# Patient Record
Sex: MALE | Employment: FULL TIME | URBAN - METROPOLITAN AREA
[De-identification: names, ages, dates, MRNs, and addresses within clinical notes are randomized per-mention and may not be internally consistent; named-entity substitution may affect disease eponyms.]

---

## 2017-03-01 ENCOUNTER — HOSPITAL ENCOUNTER (OUTPATIENT)
Dept: LAB | Age: 32
Discharge: HOME | End: 2017-03-01

## 2017-03-01 LAB — SENTARA SPECIMEN COL,SENBCF: NORMAL

## 2017-03-01 PROCEDURE — 99001 SPECIMEN HANDLING PT-LAB: CPT

## 2017-03-02 ENCOUNTER — DOCUMENTATION ONLY (OUTPATIENT)
Dept: NEUROLOGY | Age: 32
End: 2017-03-02

## 2017-03-03 NOTE — TELEPHONE ENCOUNTER
Requested Prescriptions     Pending Prescriptions Disp Refills    levETIRAcetam (KEPPRA) 500 mg tablet 60 Tab 3     Sig: Take 1 Tab by mouth two (2) times a day.      Pt would like script to go to [facility] on [address]   Has upcoming appointment 03/22/2017  And only has 4 days worth of medication left

## 2017-03-05 LAB — LEVETIRACETAM, S, 716937: 5.9 UG/ML

## 2017-03-06 RX ORDER — LEVETIRACETAM 500 MG/1
500 TABLET ORAL 2 TIMES DAILY
Qty: 60 TAB | Refills: 1 | Status: SHIPPED | OUTPATIENT
Start: 2017-03-06 | End: 2017-03-22 | Stop reason: SDUPTHER

## 2017-03-06 NOTE — TELEPHONE ENCOUNTER
Requested Prescriptions     Pending Prescriptions Disp Refills    levETIRAcetam (KEPPRA) 500 mg tablet 60 Tab 3     Sig: Take 1 Tab by mouth two (2) times a day.      Patient states he only has one day left of medication

## 2017-03-22 ENCOUNTER — OFFICE VISIT (OUTPATIENT)
Dept: NEUROLOGY | Age: 32
End: 2017-03-22

## 2017-03-22 VITALS
HEIGHT: 73 IN | BODY MASS INDEX: 38.57 KG/M2 | TEMPERATURE: 98.5 F | SYSTOLIC BLOOD PRESSURE: 118 MMHG | DIASTOLIC BLOOD PRESSURE: 84 MMHG | WEIGHT: 291 LBS | OXYGEN SATURATION: 98 % | HEART RATE: 77 BPM | RESPIRATION RATE: 14 BRPM

## 2017-03-22 DIAGNOSIS — R56.9 CONVULSIONS, UNSPECIFIED CONVULSION TYPE (HCC): Primary | ICD-10-CM

## 2017-03-22 DIAGNOSIS — G47.33 OSA (OBSTRUCTIVE SLEEP APNEA): ICD-10-CM

## 2017-03-22 DIAGNOSIS — R56.9 NOCTURNAL SEIZURES (HCC): ICD-10-CM

## 2017-03-22 RX ORDER — LEVETIRACETAM 500 MG/1
TABLET ORAL
Qty: 90 TAB | Refills: 5 | Status: SHIPPED | OUTPATIENT
Start: 2017-03-22 | End: 2017-08-29 | Stop reason: SDUPTHER

## 2017-03-22 NOTE — MR AVS SNAPSHOT
Visit Information Date & Time Provider Department Dept. Phone Encounter #  
 3/22/2017  9:45 AM Kenneth Story  [facility] [address] [address] [id number] Follow-up Instructions Return in about 6 months (around 2017). Follow-up and Disposition History Upcoming Health Maintenance Date Due DTaP/Tdap/Td series (1 - Tdap) 2008 INFLUENZA AGE 9 TO ADULT 2016 Allergies as of 3/22/2017  Review Complete On: 3/22/2017 By: Kenneth Story MD  
 No Known Allergies Current Immunizations  Never Reviewed No immunizations on file. Not reviewed this visit You Were Diagnosed With   
  
 Codes Comments Convulsions, [facility]    -  Primary ICD-10-CM: R56.9 ICD-9-CM: 780.39 Nocturnal seizures ([facility]     ICD-10-CM: R15.460 ICD-9-CM: 345.80 MARTIN (obstructive sleep apnea)     ICD-10-CM: G47.33 
ICD-9-CM: 327.23 Vitals BP Pulse Temp Resp Height(growth percentile) Weight(growth percentile) 118/84 (BP 1 Location: Left arm, BP Patient Position: Sitting) 77 98.5 °F (36.9 °C) (Oral) 14 6' 1\" (1.854 m) 291 lb (132 kg) SpO2 BMI Smoking Status 98% 38.39 kg/m2 Never Smoker Vitals History BMI and BSA Data Body Mass Index Body Surface Area  
 38.39 kg/m 2 2.61 m 2 Preferred Pharmacy Pharmacy Name Phone [address], [facility], [address] [place] [address] [phone] Your Updated Medication List  
  
   
This list is accurate as of: 3/22/17 10:24 AM.  Always use your most recent med list.  
  
  
  
  
 levETIRAcetam 500 mg tablet Commonly known as:  KEPPRA  
1 q am 2 qhs  
  
 ONE-A-DAY MEN'S PO Take  by mouth. ZyrTEC 10 mg Cap Generic drug:  Cetirizine Take  by mouth. Prescriptions Sent to Pharmacy Refills  
 levETIRAcetam (KEPPRA) 500 mg tablet 5  Si q am 2 qhs  
 Class: Normal  
 Pharmacy: [address] [facility] #: [phone] Follow-up Instructions Return in about 6 months (around 9/22/2017). To-Do List   
 03/22/2017 Lab:  LEVETIRACETAM (KEPPRA) Patient Instructions   
leela has been seizure free for over 6 months may drive [facility]! Rei Khoury introduces [employer] patient portal. Now you can access parts of your medical record, email your doctor's office, and request medication refills online. 1. In your internet browser, go to [email or web address] 2. Click on the First Time User? Click Here link in the Sign In box. You will see the New Member Sign Up page. 3. Enter your [employer] Access Code exactly as it appears below. You will not need to use this code after youve completed the sign-up process. If you do not sign up before the expiration date, you must request a new code. · [employer] Access Code: [email or web address] Expires: 5/30/2017  3:08 PM 
 
4. Enter the last four digits of your Social Security Number (xxxx) and Date of Birth (mm/dd/yyyy) as indicated and click Submit. You will be taken to the next sign-up page. 5. Create a [employer] ID. This will be your [employer] login ID and cannot be changed, so think of one that is secure and easy to remember. 6. Create a [employer] password. You can change your password at any time. 7. Enter your Password Reset Question and Answer. This can be used at a later time if you forget your password. 8. Enter your e-mail address. You will receive e-mail notification when new information is available in [address] 9. Click Sign Up. You can now view and download portions of your medical record. 10. Click the Download Summary menu link to download a portable copy of your medical information. If you have questions, please visit the Frequently Asked Questions section of the [employer] website.  Remember, [employer] is NOT to be used for urgent needs. For medical emergencies, dial 911. Now available from your iPhone and Android! Please provide this summary of care documentation to your next provider. Your primary care clinician is listed as Yrn Valencia. If you have any questions after today's visit, please call [phone].

## 2017-03-22 NOTE — PROGRESS NOTES
[facility]             [address] [place] [address] Dr Franco, [address]    3/22/2017    HPI:  Klaudia Bar is a 36 y.o., left handed,   male, who presents with single seizure occurring in sleep. Patient reports that in August while sleeping he was observed by his girlfriend to have tonic-clonic movements in his sleep for several minutes then postictal confusion. He is unaware of the event. He did note that he did have a headache following the episode. Patient was not evaluated in the emergency room underwent extensive evaluation including MRI of the brain which is personally reviewed is normal as well as EEG that showed a single temporal discharge sharp. He denies prior history of head trauma family history of seizures or personal history of seizures he does snore but he denies daytime sleepiness he reports toleration of the Keppra which was started in the emergency room he denies recreational drug use is used and he is a . He has had episodes of dickson vu but no lapses of awareness    Patient reports no further seizuresno difficulty taking Keppra he is losing weight intentionally with exercise improved diet  Ess=5/24 stop bang 6/8  Current Outpatient Prescriptions   Medication Sig Dispense Refill    levETIRAcetam (KEPPRA) 500 mg tablet 1 q am 2 qhs 90 Tab 5    MULTIVIT-MINERALS/FA/LYCOPENE (ONE-A-DAY MEN'S PO) Take  by mouth.  Cetirizine (ZYRTEC) 10 mg cap Take  by mouth.          Past Medical History:   Diagnosis Date    Epilepsy [facility])        Past Surgical History:   Procedure Laterality Date    HX UROLOGICAL      testical     Family History   Problem Relation Age of Onset    Diabetes Mother     Asthma Father      No Known Allergies    Review of Systems:   Review of Systems - History obtained from the patient  General ROS: negative  Psychological ROS: negative  ENT ROS: negative  Hematological and Lymphatic ROS: negative  Endocrine ROS: negative  Respiratory ROS: no cough, shortness of breath, or wheezing  Cardiovascular ROS: no chest pain or dyspnea on exertion  Gastrointestinal ROS: no abdominal pain, change in bowel habits, or black or bloody stools  Genito-Urinary ROS: no dysuria, trouble voiding, or hematuria  Musculoskeletal ROS: negative  Neurological ROS: positive for - seizure  Dermatological ROS: negative    PHYSICAL EXAMINATION:    Visit Vitals    /84 (BP 1 Location: Left arm, BP Patient Position: Sitting)    Pulse 77    Temp 98.5 °F (36.9 °C) (Oral)    Resp 14    Ht 6' 1\" (1.854 m)    Wt 132 kg (291 lb)    SpO2 98%    BMI 38.39 kg/m2     General:  Well defined, nourished, and groomed individual in no acute distress. Neck: Supple, nontender, thyroid within normal limits, no JVD, no bruits, no pain with resistance to active range of motion. Nc=47 mallapati 3  Heart: Regular rate and rhythm, no murmurs, rub, or gallop. Normal S1S2. Lungs:  Clear to auscultation bilaterally with equal chest expansion, no cough, no wheeze  Musculoskeletal:  Extremities revealed no edema and had full range of motion of joints. Psych:  Good mood and normal affect    NEUROLOGICAL EXAMINATION:     Mental Status:   Alert and oriented to person, place, and time with recent and remote memory intact. Attention span and concentration are normal. Speech is fluent with a full fund of knowledge. Cranial Nerves:    II, III, IV, VI:  Visual acuity grossly intact. Visual fields are normal.    Pupils are equal, round, and reactive to light and accommodation. Extra-ocular movements are full and fluid. no ptosis or nystagmus. V-XII: Hearing is grossly intact. Facial features are symmetric 5/5. Motor Examination: Normal tone, bulk, and strength, 5/5 muscle strength throughout. No cogwheel rigidity or clonus present.       Coordination:     No resting or intention tremor    Gait and Station:  Steady while walking on toes, heels, and with tandem walking. Normal arm swing. No Romberg or pronator drift. No muscle wasting or fasiculations noted. psg reviewed as mild rem only martin  eeg single discharge sharp  Temporal,mri nl    keppra level 5.9  Assessment and Plan:   Lynette Brock is a 36 y.o. left handed male whose history and physical are consistent with   Single nocturnal seizure. Lynette Brock who has risk factors including mildly abnormal eeg  Helena Zach was seen today for follow-up and neurologic problem. Diagnoses and all orders for this visit:    Convulsions, unspecified convulsion type ([facility]  -     LEVETIRACETAM (KEPPRA); Future    Nocturnal seizures (HCC)  -     LEVETIRACETAM (KEPPRA); Future    MARTIN (obstructive sleep apnea)    Other orders  -     levETIRAcetam (KEPPRA) 500 mg tablet; 1 q am 2 qhs      Follow-up Disposition:  Return in about 6 months (around 9/22/2017). Reviewed workup  Accomplished and implications, dmv regulations reviewed r sleep study  As showing mild rem only MARTIN advised weight lossI spent 30  minutes with the patient in face-to-face consultation, of which greater than 50% was spent in counseling and coordination of care as described above.

## 2017-03-22 NOTE — LETTER
3/22/2017 10:20 AM 
 
Mr. Alvarez Keane [address] [facility] [id number] To Whom It May Concern, Alvarez Keane is under my care and is approved to resume driving a motor vehicle. Sincerely, Siomara Carver MD

## 2017-05-02 ENCOUNTER — TELEPHONE (OUTPATIENT)
Dept: NEUROLOGY | Age: 32
End: 2017-05-02

## 2017-05-02 NOTE — TELEPHONE ENCOUNTER
Patient would like to know if he can take a multi-vitamin called Opti-Men along with his Keppra.  Please advise patient at [phone]

## 2017-05-09 ENCOUNTER — TELEPHONE (OUTPATIENT)
Dept: NEUROLOGY | Age: 32
End: 2017-05-09

## 2017-08-29 ENCOUNTER — OFFICE VISIT (OUTPATIENT)
Dept: NEUROLOGY | Age: 32
End: 2017-08-29

## 2017-08-29 ENCOUNTER — HOSPITAL ENCOUNTER (OUTPATIENT)
Dept: LAB | Age: 32
Discharge: HOME | End: 2017-08-29

## 2017-08-29 VITALS
HEART RATE: 70 BPM | DIASTOLIC BLOOD PRESSURE: 70 MMHG | SYSTOLIC BLOOD PRESSURE: 110 MMHG | TEMPERATURE: 98.3 F | BODY MASS INDEX: 41.75 KG/M2 | OXYGEN SATURATION: 97 % | HEIGHT: 73 IN | WEIGHT: 315 LBS | RESPIRATION RATE: 18 BRPM

## 2017-08-29 DIAGNOSIS — R56.9 NOCTURNAL SEIZURES (HCC): Primary | ICD-10-CM

## 2017-08-29 DIAGNOSIS — R56.9 CONVULSIONS, UNSPECIFIED CONVULSION TYPE (HCC): ICD-10-CM

## 2017-08-29 DIAGNOSIS — R56.9 NOCTURNAL SEIZURES (HCC): ICD-10-CM

## 2017-08-29 LAB — SENTARA SPECIMEN COL,SENBCF: NORMAL

## 2017-08-29 PROCEDURE — 99001 SPECIMEN HANDLING PT-LAB: CPT | Performed by: PSYCHIATRY & NEUROLOGY

## 2017-08-29 RX ORDER — LEVETIRACETAM 500 MG/1
TABLET ORAL
Qty: 90 TAB | Refills: 5 | Status: SHIPPED | OUTPATIENT
Start: 2017-08-29 | End: 2018-03-01 | Stop reason: SDUPTHER

## 2017-08-29 NOTE — MR AVS SNAPSHOT
Visit Information Date & Time Provider Department Dept. Phone Encounter #  
 8/29/2017 11:00 AM Fermin Helton, [address] [id number] Follow-up Instructions Return in about 6 months (around 2/28/2018). Follow-up and Disposition History Your Appointments 3/1/2018 11:00 AM  
Follow Up with Fermin Helton MD  
[facility]) Appt Note: 6mon f/u; Labs [address] [place] [id number] [phone]  
  
   
 [place] [id number] Upcoming Health Maintenance Date Due DTaP/Tdap/Td series (1 - Tdap) 9/2/2008 INFLUENZA AGE 9 TO ADULT 8/1/2017 Allergies as of 8/29/2017  Review Complete On: 8/29/2017 By: Kathleen Turner LPN No Known Allergies Current Immunizations  Never Reviewed No immunizations on file. Not reviewed this visit You Were Diagnosed With   
  
 Codes Comments Nocturnal seizures ([facility]    -  Primary ICD-10-CM: G40.909 ICD-9-CM: 345.80 Convulsions, unspecified convulsion type ([facility]     ICD-10-CM: R56.9 ICD-9-CM: 780.39 Vitals BP Pulse Temp Resp Height(growth percentile) Weight(growth percentile) 110/70 70 98.3 °F (36.8 °C) (Oral) 18 6' 1\" (1.854 m) 320 lb (145.2 kg) SpO2 BMI Smoking Status 97% 42.22 kg/m2 Never Smoker BMI and BSA Data Body Mass Index Body Surface Area  
 42.22 kg/m 2 2.73 m 2 Preferred Pharmacy Pharmacy Name Phone [address], [address] [facility] [phone] Your Updated Medication List  
  
   
This list is accurate as of: 8/29/17 11:38 AM.  Always use your most recent med list.  
  
  
  
  
 levETIRAcetam 500 mg tablet Commonly known as:  KEPPRA  
1 q am 2 qhs  
  
 ONE-A-DAY MEN'S PO Take  by mouth. ZyrTEC 10 mg Cap Generic drug:  Cetirizine Take  by mouth. Prescriptions Sent to Pharmacy Refills  
 levETIRAcetam (KEPPRA) 500 mg tablet 5 Si q am 2 qhs  
 Class: Normal  
 Pharmacy: [address], [address] #: [phone] Follow-up Instructions Return in about 6 months (around 2018). To-Do List   
 2017 Lab:  LEVETIRACETAM (KEPPRA) Patient Instructions Reviewed need for keppra level before rtc Introducing [facility]! [facility] introduces [employer] patient portal. Now you can access parts of your medical record, email your doctor's office, and request medication refills online. 1. In your internet browser, go to [email or web address] 2. Click on the First Time User? Click Here link in the Sign In box. You will see the New Member Sign Up page. 3. Enter your [employer] Access Code exactly as it appears below. You will not need to use this code after youve completed the sign-up process. If you do not sign up before the expiration date, you must request a new code. · [employer] Access Code: [email or web address] Expires: 2017 10:43 AM 
 
4. Enter the last four digits of your Social Security Number (xxxx) and Date of Birth (mm/dd/yyyy) as indicated and click Submit. You will be taken to the next sign-up page. 5. Create a [employer] ID. This will be your [employer] login ID and cannot be changed, so think of one that is secure and easy to remember. 6. Create a [employer] password. You can change your password at any time. 7. Enter your Password Reset Question and Answer. This can be used at a later time if you forget your password. 8. Enter your e-mail address. You will receive e-mail notification when new information is available in [address] 9. Click Sign Up. You can now view and download portions of your medical record. 10. Click the Download Summary menu link to download a portable copy of your medical information. If you have questions, please visit the Frequently Asked Questions section of the [employer] website. Remember, [employer] is NOT to be used for urgent needs. For medical emergencies, dial 911. Now available from your iPhone and Android! Please provide this summary of care documentation to your next provider. Your primary care clinician is listed as Alverta Alert. If you have any questions after today's visit, please call [phone].

## 2017-08-29 NOTE — PROGRESS NOTES
[facility]             [address] [facility], [address] [place] [address]    8/29/2017    HPI:  Jim Caldwell is a 36 y.o., left handed,   male, who presents with single seizure occurring in sleep. Patient reports that in August while sleeping he was observed by his girlfriend to have tonic-clonic movements in his sleep for several minutes then postictal confusion. He is unaware of the event. He did note that he did have a headache following the episode. Patient was not evaluated in the emergency room underwent extensive evaluation including MRI of the brain which is personally reviewed is normal as well as EEG that showed a single temporal discharge sharp. He denies prior history of head trauma family history of seizures or personal history of seizures he does snore but he denies daytime sleepiness he reports toleration of the Keppra which was started in the emergency room he denies recreational drug use is used and he is a . He has had episodes of dickson vu but no lapses of awareness    Patient reports no further seizures no difficulty taking Keppra he is losing weight intentionally with exercise improved diet He did forget to get lab  Ess=5/24 stop bang 6/8  Current Outpatient Prescriptions   Medication Sig Dispense Refill    levETIRAcetam (KEPPRA) 500 mg tablet 1 q am 2 qhs 90 Tab 5    MULTIVIT-MINERALS/FA/LYCOPENE (ONE-A-DAY MEN'S PO) Take  by mouth.  Cetirizine (ZYRTEC) 10 mg cap Take  by mouth.          Past Medical History:   Diagnosis Date    Epilepsy [facility])        Past Surgical History:   Procedure Laterality Date    HX UROLOGICAL      testical     Family History   Problem Relation Age of Onset    Diabetes Mother     Asthma Father      No Known Allergies    Review of Systems:   Review of Systems - History obtained from the patient  General ROS: negative  Psychological ROS: negative  ENT ROS: negative  Hematological and Lymphatic ROS: negative  Endocrine ROS: negative  Respiratory ROS: no cough, shortness of breath, or wheezing  Cardiovascular ROS: no chest pain or dyspnea on exertion  Gastrointestinal ROS: no abdominal pain, change in bowel habits, or black or bloody stools  Genito-Urinary ROS: no dysuria, trouble voiding, or hematuria  Musculoskeletal ROS: negative  Neurological ROS: positive for - seizure  Dermatological ROS: negative    PHYSICAL EXAMINATION:    Visit Vitals    /70    Pulse 70    Temp 98.3 °F (36.8 °C) (Oral)    Resp 18    Ht 6' 1\" (1.854 m)    Wt 145.2 kg (320 lb)    SpO2 97%    BMI 42.22 kg/m2     General:  Well defined, nourished, and groomed individual in no acute distress. Neck: Supple, nontender, thyroid within normal limits, no JVD, no bruits, no pain with resistance to active range of motion. Nc=47 mallapati 3  Heart: Regular rate and rhythm, no murmurs, rub, or gallop. Normal S1S2. Lungs:  Clear to auscultation bilaterally with equal chest expansion, no cough, no wheeze  Musculoskeletal:  Extremities revealed no edema and had full range of motion of joints. Psych:  Good mood and normal affect    NEUROLOGICAL EXAMINATION:     Mental Status:   Alert and oriented to person, place, and time with recent and remote memory intact. Attention span and concentration are normal. Speech is fluent with a full fund of knowledge. Cranial Nerves:    II, III, IV, VI:  Visual acuity grossly intact. Visual fields are normal.    Pupils are equal, round, and reactive to light and accommodation. Extra-ocular movements are full and fluid. no ptosis or nystagmus. V-XII: Hearing is grossly intact. Facial features are symmetric 5/5. Motor Examination: Normal tone, bulk, and strength, 5/5 muscle strength throughout. No cogwheel rigidity or clonus present. Coordination:     No resting or intention tremor    Gait and Station:  Steady while walking  No muscle wasting or fasiculations noted. psg reviewed as mild rem only adia  eeg single discharge sharp  Temporal,mri nl    Assessment and Plan:   Mj Blake is a 36 y.o. left handed male whose history and physical are consistent with   Single nocturnal seizure. Mj Blake who has risk factors including mildly abnormal eeg  Diagnoses and all orders for this visit:    1. Nocturnal seizures (HCC)  -     LEVETIRACETAM (KEPPRA); Future    2. Convulsions, unspecified convulsion type ([facility]  -     LEVETIRACETAM (KEPPRA); Future    Other orders  -     levETIRAcetam (KEPPRA) 500 mg tablet; 1 q am 2 qhs      Follow-up Disposition:  Return in about 6 months (around 2/28/2018). Reviewed workup  Accomplished and implications, dmv regulations reviewed r sleep study  As showing mild rem only ADIA advised weight lossI spent 30  minutes with the patient in face-to-face consultation, of which greater than 50% was spent in counseling and coordination of care as described above.

## 2017-09-01 LAB — LEVETIRACETAM, S, 716937: 8.5 UG/ML

## 2017-12-21 ENCOUNTER — TELEPHONE (OUTPATIENT)
Dept: NEUROLOGY | Age: 32
End: 2017-12-21

## 2017-12-21 NOTE — TELEPHONE ENCOUNTER
Spoke with Dr. Efrain Lombardi- patient is scheduled for appointment 3/1/17. We do not have any earlier openings at this time. Letter written and most recent office note placed with it. Left message for patient stating letter available for  at [facility] office.

## 2017-12-21 NOTE — TELEPHONE ENCOUNTER
Pt dropped off paperwork for note stating to begin employment. Pt would like to be called when completed.  At [phone] in Dr. Lynette Quintana folder at

## 2018-03-01 ENCOUNTER — OFFICE VISIT (OUTPATIENT)
Dept: NEUROLOGY | Age: 33
End: 2018-03-01

## 2018-03-01 ENCOUNTER — HOSPITAL ENCOUNTER (OUTPATIENT)
Dept: LAB | Age: 33
Discharge: HOME | End: 2018-03-01

## 2018-03-01 VITALS
BODY MASS INDEX: 40.29 KG/M2 | SYSTOLIC BLOOD PRESSURE: 112 MMHG | TEMPERATURE: 98.4 F | OXYGEN SATURATION: 97 % | HEART RATE: 73 BPM | WEIGHT: 304 LBS | DIASTOLIC BLOOD PRESSURE: 84 MMHG | HEIGHT: 73 IN | RESPIRATION RATE: 20 BRPM

## 2018-03-01 DIAGNOSIS — R56.9 CONVULSIONS, UNSPECIFIED CONVULSION TYPE (HCC): ICD-10-CM

## 2018-03-01 DIAGNOSIS — R06.83 SNORING: ICD-10-CM

## 2018-03-01 DIAGNOSIS — R56.9 NOCTURNAL SEIZURES (HCC): Primary | ICD-10-CM

## 2018-03-01 DIAGNOSIS — R56.9 NOCTURNAL SEIZURES (HCC): ICD-10-CM

## 2018-03-01 DIAGNOSIS — G47.33 OSA (OBSTRUCTIVE SLEEP APNEA): ICD-10-CM

## 2018-03-01 PROBLEM — E66.01 OBESITY, MORBID (HCC): Status: ACTIVE | Noted: 2018-03-01

## 2018-03-01 LAB — SENTARA SPECIMEN COL,SENBCF: NORMAL

## 2018-03-01 PROCEDURE — 99001 SPECIMEN HANDLING PT-LAB: CPT | Performed by: PSYCHIATRY & NEUROLOGY

## 2018-03-01 RX ORDER — LEVETIRACETAM 500 MG/1
TABLET ORAL
Qty: 90 TAB | Refills: 5 | Status: SHIPPED | OUTPATIENT
Start: 2018-03-01 | End: 2018-03-01 | Stop reason: SDUPTHER

## 2018-03-01 RX ORDER — LEVETIRACETAM 500 MG/1
TABLET ORAL
Qty: 90 TAB | Refills: 5 | Status: SHIPPED | OUTPATIENT
Start: 2018-03-01 | End: 2019-03-18 | Stop reason: SDUPTHER

## 2018-03-01 NOTE — MR AVS SNAPSHOT
[address] [address] 
[phone] Patient: Darrell Miller MRN: [id number] HMX:[id number] Visit Information Date & Time Provider Department Dept. Phone Encounter #  
 3/1/2018 11:00 AM Max Richmond, [facility] [phone] [id number] Follow-up Instructions Return in about 6 months (around 9/1/2018) for Dr Silvestre Watters. Follow-up and Disposition History Upcoming Health Maintenance Date Due DTaP/Tdap/Td series (1 - Tdap) 9/2/2008 Influenza Age 5 to Adult 8/1/2017 Allergies as of 3/1/2018  Review Complete On: 3/1/2018 By: Max Richmond MD  
 No Known Allergies Current Immunizations  Never Reviewed No immunizations on file. Not reviewed this visit You Were Diagnosed With   
  
 Codes Comments Nocturnal seizures ([facility]    -  Primary ICD-10-CM: G40.909 ICD-9-CM: 345.80 Convulsions, unspecified convulsion type ([facility]     ICD-10-CM: R56.9 ICD-9-CM: 780.39 Snoring     ICD-10-CM: R06.83 
ICD-9-CM: 786.09   
 MARTIN (obstructive sleep apnea)     ICD-10-CM: G47.33 
ICD-9-CM: 327.23 Vitals BP Pulse Temp Resp Height(growth percentile) Weight(growth percentile) 112/84 (BP 1 Location: Left arm, BP Patient Position: Sitting) 73 98.4 °F (36.9 °C) 20 6' 1\" (1.854 m) 304 lb (137.9 kg) SpO2 BMI Smoking Status 97% 40.11 kg/m2 Never Smoker Vitals History BMI and BSA Data Body Mass Index Body Surface Area  
 40.11 kg/m 2 2.67 m 2 Preferred Pharmacy Pharmacy Name Phone [address] [facility], [address] [phone] Your Updated Medication List  
  
   
This list is accurate as of 3/1/18 11:12 AM.  Always use your most recent med list.  
  
  
  
  
 levETIRAcetam 500 mg tablet Commonly known as:  KEPPRA  
1 q am 2 qhs  
  
 ONE-A-DAY MEN'S PO Take  by mouth. ZyrTEC 10 mg Cap Generic drug:  Cetirizine Take  by mouth. Prescriptions Sent to Pharmacy Refills  
 levETIRAcetam (KEPPRA) 500 mg tablet 5 Si q am 2 qhs  
 Class: Normal  
 Pharmacy: [address], [facility], [address] [place] [address] #: [phone] Follow-up Instructions Return in about 6 months (around 2018) for Dr Dani Vega. To-Do List   
 2018 Lab:  LEVETIRACETAM (KEPPRA)   
  
 2018 Lab:  LEVETIRACETAM (KEPPRA) Introducing [facility]! [place] introduces [employer] patient portal. Now you can access parts of your medical record, email your doctor's office, and request medication refills online. 1. In your internet browser, go to [email or web address] 2. Click on the First Time User? Click Here link in the Sign In box. You will see the New Member Sign Up page. 3. Enter your [employer] Access Code exactly as it appears below. You will not need to use this code after youve completed the sign-up process. If you do not sign up before the expiration date, you must request a new code. · [employer] Access Code: [email or web address] Expires: 2018 11:55 AM 
 
4. Enter the last four digits of your Social Security Number (xxxx) and Date of Birth (mm/dd/yyyy) as indicated and click Submit. You will be taken to the next sign-up page. 5. Create a [employer] ID. This will be your [employer] login ID and cannot be changed, so think of one that is secure and easy to remember. 6. Create a [employer] password. You can change your password at any time. 7. Enter your Password Reset Question and Answer. This can be used at a later time if you forget your password. 8. Enter your e-mail address. You will receive e-mail notification when new information is available in [address] 9. Click Sign Up. You can now view and download portions of your medical record. 10. Click the Download Summary menu link to download a portable copy of your medical information. If you have questions, please visit the Frequently Asked Questions section of the [email or web address] website. Remember, [email or web address] is NOT to be used for urgent needs. For medical emergencies, dial 911. Now available from your iPhone and Android! Please provide this summary of care documentation to your next provider. Your primary care clinician is listed as [facility]. If you have any questions after today's visit, please call [phone].

## 2018-03-01 NOTE — PROGRESS NOTES
Dave Locke is a 29 y.o. male in today for follow-up on seizures and labs; not completed at this time. 1. Have you been to the ER, urgent care clinic since your last visit? Hospitalized since your last visit? No    2. Have you seen or consulted any other health care providers outside of the [address] [place] [address] since your last visit? Include any pap smears or colon screening.  No

## 2018-03-01 NOTE — PROGRESS NOTES
[facility]             [address] [facility], [address]    3/1/2018    HPI:  Misty Hernandez is a 29 y.o., left handed,   male, who presents with single seizure occurring in sleep. Patient reports that in August while sleeping he was observed by his girlfriend to have tonic-clonic movements in his sleep for several minutes then postictal confusion. He is unaware of the event. He did note that he did have a headache following the episode. Patient was not evaluated in the emergency room underwent extensive evaluation including MRI of the brain which is personally reviewed is normal as well as EEG that showed a single temporal discharge sharp. He denies prior history of head trauma family history of seizures or personal history of seizures he does snore but he denies daytime sleepiness he reports toleration of the Keppra which was started in the emergency room he denies recreational drug use is used and he is a . He has had episodes of dickson vu but no lapses of awareness    Patient reports no further seizures no difficulty taking Keppra he is losing weight intentionally with exercise improved diet He did forget to get lab  Ess=5/24 stop bang 6/8  Current Outpatient Prescriptions   Medication Sig Dispense Refill    levETIRAcetam (KEPPRA) 500 mg tablet 1 q am 2 qhs 90 Tab 5    MULTIVIT-MINERALS/FA/LYCOPENE (ONE-A-DAY MEN'S PO) Take  by mouth.  Cetirizine (ZYRTEC) 10 mg cap Take  by mouth.          Past Medical History:   Diagnosis Date    Epilepsy [facility])        Past Surgical History:   Procedure Laterality Date    HX UROLOGICAL      testical     Family History   Problem Relation Age of Onset    Diabetes Mother     Asthma Father      No Known Allergies    Review of Systems:   Review of Systems - History obtained from the patient  General ROS: negative  Psychological ROS: negative  ENT ROS: negative  Hematological and Lymphatic ROS: negative  Endocrine ROS: negative  Respiratory ROS: no cough, shortness of breath, or wheezing  Cardiovascular ROS: no chest pain or dyspnea on exertion  Gastrointestinal ROS: no abdominal pain, change in bowel habits, or black or bloody stools  Genito-Urinary ROS: no dysuria, trouble voiding, or hematuria  Musculoskeletal ROS: negative  Neurological ROS: positive for - seizure  Dermatological ROS: negative    PHYSICAL EXAMINATION:    Visit Vitals    /84 (BP 1 Location: Left arm, BP Patient Position: Sitting)    Pulse 73    Temp 98.4 °F (36.9 °C)    Resp 20    Ht 6' 1\" (1.854 m)    Wt 137.9 kg (304 lb)    SpO2 97%    BMI 40.11 kg/m2     General:  Well defined, nourished, and groomed individual in no acute distress. Neck: Supple, nontender, thyroid within normal limits, no JVD, no bruits, no pain with resistance to active range of motion. Nc=47 mallapati 3  Heart: Regular rate and rhythm, no murmurs, rub, or gallop. Normal S1S2. Lungs:  Clear to auscultation bilaterally with equal chest expansion, no cough, no wheeze  Musculoskeletal:  Extremities revealed no edema and had full range of motion of joints. Psych:  Good mood and normal affect    NEUROLOGICAL EXAMINATION:     Mental Status:   Alert and oriented to person, place, and time with recent and remote memory intact. Attention span and concentration are normal. Speech is fluent with a full fund of knowledge. Cranial Nerves:    II, III, IV, VI:  Visual acuity grossly intact. Visual fields are normal.    Pupils are equal, round, and reactive to light and accommodation. Extra-ocular movements are full and fluid. no ptosis or nystagmus. V-XII: Hearing is grossly intact. Facial features are symmetric 5/5. Motor Examination: Normal tone, bulk, and strength, 5/5 muscle strength throughout. No cogwheel rigidity or clonus present.       Coordination:     No resting or intention tremor    Gait and Station:  Steady while walking  No muscle wasting or fasiculations noted. psg reviewed as mild rem only adia  eeg single discharge sharp  Temporal,mri nl    Assessment and Plan:   Naresh Plunkett is a 29 y.o. left handed male whose history and physical are consistent with   Single nocturnal seizure. Naresh Plunkett who has risk factors including mildly abnormal eeg  Diagnoses and all orders for this visit:    1. Nocturnal seizures (HCC)  -     LEVETIRACETAM (KEPPRA); Future  -     LEVETIRACETAM (KEPPRA); Future    2. Convulsions, unspecified convulsion type ([facility]  -     LEVETIRACETAM (KEPPRA); Future  -     LEVETIRACETAM (KEPPRA); Future    3. Snoring    4. ADIA (obstructive sleep apnea)    Other orders  -     levETIRAcetam (KEPPRA) 500 mg tablet; 1 q am 2 qhs      Follow-up Disposition:  Return in about 6 months (around 9/1/2018) for Dr Nydia Crowder. Reviewed workup  Accomplished and implications, dmv regulations reviewedsleep study  As showing mild rem only ADIA advised weight loss  Need level today and before rtcI spent 30  minutes with the patient in face-to-face consultation, of which greater than 50% was spent in counseling and coordination of care as described above.

## 2018-03-01 NOTE — TELEPHONE ENCOUNTER
Requested Prescriptions     Pending Prescriptions Disp Refills    levETIRAcetam (KEPPRA) 500 mg tablet 90 Tab 5     Si q am 2 qhs     Prescription sent to wrong pharmacy. .corrected pharmacy is set

## 2019-03-18 NOTE — TELEPHONE ENCOUNTER
Requested Prescriptions     Pending Prescriptions Disp Refills    levETIRAcetam (KEPPRA) 500 mg tablet 90 Tab 5     Si q am 2 qhs      Faxed script scanned to chart

## 2019-03-25 RX ORDER — LEVETIRACETAM 500 MG/1
TABLET ORAL
Qty: 90 TAB | Refills: 1 | Status: SHIPPED | OUTPATIENT
Start: 2019-03-25 | End: 2019-12-12

## 2019-03-25 NOTE — TELEPHONE ENCOUNTER
30 days with one refill. Has not been here for over a year, must see me for further refills before current rx expires.

## 2019-04-17 ENCOUNTER — OFFICE VISIT (OUTPATIENT)
Dept: NEUROLOGY | Age: 34
End: 2019-04-17

## 2019-04-17 VITALS
HEART RATE: 85 BPM | BODY MASS INDEX: 37.64 KG/M2 | SYSTOLIC BLOOD PRESSURE: 104 MMHG | TEMPERATURE: 97.9 F | HEIGHT: 73 IN | DIASTOLIC BLOOD PRESSURE: 70 MMHG | RESPIRATION RATE: 16 BRPM | OXYGEN SATURATION: 98 % | WEIGHT: 284 LBS

## 2019-04-17 DIAGNOSIS — G47.33 OSA (OBSTRUCTIVE SLEEP APNEA): ICD-10-CM

## 2019-04-17 DIAGNOSIS — R56.9 CONVULSIONS, UNSPECIFIED CONVULSION TYPE (HCC): Primary | ICD-10-CM

## 2019-04-17 PROBLEM — E66.01 SEVERE OBESITY (HCC): Status: ACTIVE | Noted: 2019-04-17

## 2019-04-17 NOTE — PROGRESS NOTES
ROOM # 4    Klaudia Bar presents today for   Chief Complaint   Patient presents with    Follow-up    Medication Evaluation       Shavon Medellin preferred language for health care discussion is english/other. Depression Screening:  3 most recent PHQ Screens 3/1/2018   Little interest or pleasure in doing things Not at all   Feeling down, depressed, irritable, or hopeless Not at all   Total Score PHQ 2 0       Learning Assessment:  No flowsheet data found. Abuse Screening:  No flowsheet data found. Fall Risk  No flowsheet data found. Visit Vitals  /70 (BP 1 Location: Left arm, BP Patient Position: Sitting)   Pulse 85   Temp 97.9 °F (36.6 °C) (Oral)   Resp 16   Ht 6' 1\" (1.854 m)   Wt 284 lb (128.8 kg)   SpO2 98%   BMI 37.47 kg/m²       Health Maintenance reviewed and discussed per provider. Yes    Klaudia Bar is due for   Health Maintenance Due   Topic Date Due    DTaP/Tdap/Td series (1 - Tdap) 09/02/2008     Please order/place referral if appropriate. Advance Directive:  1. Do you have an advance directive in place? Patient Reply: No    2. If not, would you like material regarding how to put one in place? Patient Reply: NO    Coordination of Care:  1. Have you been to the ER, urgent care clinic since your last visit? Hospitalized since your last visit?  No

## 2019-04-17 NOTE — PROGRESS NOTES
4/17/2019 3:47 PM    SSN: [id number]    Subjective:   37-year-old male coming for follow-up for a history of seizures. The last time he was here was in March 1, 2018. I have reviewed his charts. At that time he saw Dr. Cosme Fountain. He has a history of a single seizure which occurred in his sleep in August 2017. Reportedly his girlfriend observed him to have tonic-clonic movements in his sleep for several minutes and then he was confused, not remembering the event. He did have a headache after the episode. He had evaluation which showed an MRI which was normal.  There are reports of an EEG showing \"a single temporal discharge sharp\". On the report there is mention of a \"left frontal sharp discharge noted, it was also noted in sleep, and might represent a vertex activity, although not seen symmetrically on the right side\". He was started on Keppra. He is a . He has not had any further seizures since he started this medication. A sleep study done for symptoms of hypersomnia and suspicion of obstructive sleep apnea was done on September 2016 and showed an overall apnea hypopnea index of 2 with an overall RDI of 3.3. REM AHI was 9 and RDI during REM was 14, had a minimal desaturation to 75% in non-REM sleep, based on these numbers the conclusion was that he had a mild obstructive sleep apnea while in rem sleep. Since then he denies EDS and has lost weight.      Social History     Socioeconomic History    Marital status: SINGLE     Spouse name: Not on file    Number of children: Not on file    Years of education: Not on file    Highest education level: Not on file   Occupational History    Not on file   Social Needs    Financial resource strain: Not on file    Food insecurity:     Worry: Not on file     Inability: Not on file    Transportation needs:     Medical: Not on file     Non-medical: Not on file   Tobacco Use    Smoking status: Never Smoker    Smokeless tobacco: Never Used   Substance and Sexual Activity    Alcohol use: Yes     Comment: once in a blue moon    Drug use: No    Sexual activity: Not on file   Lifestyle    Physical activity:     Days per week: Not on file     Minutes per session: Not on file    Stress: Not on file   Relationships    Social connections:     Talks on phone: Not on file     Gets together: Not on file     Attends Christian service: Not on file     Active member of club or organization: Not on file     Attends meetings of clubs or organizations: Not on file     Relationship status: Not on file    Intimate partner violence:     Fear of current or ex partner: Not on file     Emotionally abused: Not on file     Physically abused: Not on file     Forced sexual activity: Not on file   Other Topics Concern    Not on file   Social History Narrative    Not on file       Family History   Problem Relation Age of Onset    Diabetes Mother     Asthma Father        Current Outpatient Medications   Medication Sig Dispense Refill    levETIRAcetam (KEPPRA) 500 mg tablet 1 q am 2 qhs 90 Tab 1    MULTIVIT-MINERALS/FA/LYCOPENE (ONE-A-DAY MEN'S PO) Take  by mouth.  Cetirizine (ZYRTEC) 10 mg cap Take  by mouth. Past Medical History:   Diagnosis Date    Epilepsy [facility])        Past Surgical History:   Procedure Laterality Date    HX UROLOGICAL      testical       No Known Allergies    Vital signs:    Visit Vitals  /70 (BP 1 Location: Left arm, BP Patient Position: Sitting)   Pulse 85   Temp 97.9 °F (36.6 °C) (Oral)   Resp 16   Ht 6' 1\" (1.854 m)   Wt 128.8 kg (284 lb)   SpO2 98%   BMI 37.47 kg/m²       Review of Systems:   GENERAL: Denies fever or fatigue  CARDIAC: No CP or SOB  PULMONARY: No cough of SOB  MUSCULOSKELETAL: No new joint pain  NEURO: SEE HPI      EXAM: Alert, in NAD. Heart is regular. Oriented x3, EOM's are full, PERRL, no facial asymmetries.  Strength and tone are normal. DTR's +2, gait symmetric Assessment/Plan:   Single seizure close to 2 years ago, without any recurrence, happening at night without any known provoking factors. I question the need to commit him to potentially lifelong anticonvulsant therapy. His MRI is normal, he had a normal neurological exam, this was a single seizure and the EEG is very ambivalent and not clearly establishing an epileptiform abnormality. I discussed this with the patient. I explained that EEGs are often over read. I discussed the pros and cons of discontinuation of therapy versus continuation of therapy indefinitely. I discussed the risks and repercussions recurrence of seizure activity would have on his driving. He elects to try to discontinue medication. With this in mind I advised him to wean Y taking 500 mg twice a day for the next week, then 500 mg at bedtime for 1 more week, then stop. 4 weeks from now, or 2 weeks after Keppra discontinuation, I will obtain an EEG. Only if he has another unprovoked seizure or if the EEG shows convincing epileptiform anomalies predicting seizure recurrence we will discuss restarting the medication. Given his weight loss and lack of excessive daytime sleepiness I do not see the need to repeat his polysomnographic evaluation. He will return to see me in 6 weeks. 25 out of 40 minutes were spent counseling on the above. PLEASE NOTE:   Portions of this document may have been produced using voice recognition software. Unrecognized errors in transcription may be present. This note will not be viewable in [address]

## 2019-05-06 ENCOUNTER — HOSPITAL ENCOUNTER (OUTPATIENT)
Dept: NEUROLOGY | Age: 34
Discharge: HOME | End: 2019-05-06
Attending: PSYCHIATRY & NEUROLOGY

## 2019-05-06 DIAGNOSIS — R56.9 CONVULSIONS, UNSPECIFIED CONVULSION TYPE (HCC): ICD-10-CM

## 2019-05-06 NOTE — PROGRESS NOTES
Patient arrived for EEG testing today and was rescheduled due to not being off Keppra, for the prescribed amount of time,  as ordered by the physician. He was advised to reschedule after May 15, 2019. Patient was provided number to Central Scheduling and asked to reschedule his testing after May 15, 2019.

## 2019-05-08 DIAGNOSIS — R56.9 CONVULSIONS, UNSPECIFIED CONVULSION TYPE (HCC): Primary | ICD-10-CM

## 2019-05-24 ENCOUNTER — HOSPITAL ENCOUNTER (OUTPATIENT)
Dept: NEUROLOGY | Age: 34
Discharge: HOME | End: 2019-05-24
Attending: PSYCHIATRY & NEUROLOGY
Payer: COMMERCIAL

## 2019-05-24 DIAGNOSIS — R56.9 CONVULSIONS, UNSPECIFIED CONVULSION TYPE (HCC): ICD-10-CM

## 2019-05-24 PROCEDURE — 95816 EEG AWAKE AND DROWSY: CPT

## 2019-05-29 NOTE — PROCEDURES
[facility]  EEG    Name:  Eric Jiang  MR#:   [id number]  :  1985  ACCOUNT #:  [de-identified]  DATE OF SERVICE:  2019    EEG Number:      HISTORY:  A 62-year-old male with a history of a single nocturnal seizure, who was recently weaned off of Keppra. MEDICATION:  Zyrtec. EEG REPORT:  This is a 16-channel sleep deprived EEG done, using the international 10-20 electrode placement system. The predominant background consists of 9 Hz symmetric and posterior predominant activity, which attenuates with eye opening. There are periods of 6-7 Hz more central symmetric activity consistent with drowsiness. This transitions into stage II sleep with evidence of central vertex waves, K-complexes, and sleep spindles. Hyperventilation does not alter the background. No abnormal photoparoxysmal responses are observed. No epileptiform abnormalities are seen. IMPRESSION:  This is a normal awake, drowsy, and a sleep EEG.       Chata Shah MD      [id number]  D:  2019 13:50  T:  2019 23:28  JOB #:  [id number]

## 2019-06-24 ENCOUNTER — OFFICE VISIT (OUTPATIENT)
Dept: NEUROLOGY | Age: 34
End: 2019-06-24

## 2019-06-24 VITALS
SYSTOLIC BLOOD PRESSURE: 120 MMHG | TEMPERATURE: 98.6 F | HEART RATE: 72 BPM | HEIGHT: 73 IN | DIASTOLIC BLOOD PRESSURE: 70 MMHG | OXYGEN SATURATION: 97 % | WEIGHT: 278 LBS | BODY MASS INDEX: 36.84 KG/M2 | RESPIRATION RATE: 16 BRPM

## 2019-06-24 DIAGNOSIS — R56.9 CONVULSIONS, UNSPECIFIED CONVULSION TYPE (HCC): Primary | ICD-10-CM

## 2019-06-24 NOTE — PROGRESS NOTES
6/24/2019 11:46 AM    SSN: [id number]    Subjective:   53-year-old male here for CC of a history of a single seizure 2 years ago. On the last visit we discussed the fact that he had been on Keppra after this single seizure which was nocturnal without any provoking factor. An MRI of the brain was normal, and EEG did not show any clear epileptiform abnormalities, so after discussion of pros and cons we opted to try to discontinue the medication. I instructed him to wean the Keppra. He had a repeat EEG 2 weeks after stopping this medication on May 24 which was completely normal, included drowsiness and sleep.     Social History     Socioeconomic History    Marital status: SINGLE     Spouse name: Not on file    Number of children: Not on file    Years of education: Not on file    Highest education level: Not on file   Occupational History    Not on file   Social Needs    Financial resource strain: Not on file    Food insecurity:     Worry: Not on file     Inability: Not on file    Transportation needs:     Medical: Not on file     Non-medical: Not on file   Tobacco Use    Smoking status: Never Smoker    Smokeless tobacco: Never Used   Substance and Sexual Activity    Alcohol use: Yes     Comment: once in a blue moon    Drug use: No    Sexual activity: Not on file   Lifestyle    Physical activity:     Days per week: Not on file     Minutes per session: Not on file    Stress: Not on file   Relationships    Social connections:     Talks on phone: Not on file     Gets together: Not on file     Attends [employer] service: Not on file     Active member of club or organization: Not on file     Attends meetings of clubs or organizations: Not on file     Relationship status: Not on file    Intimate partner violence:     Fear of current or ex partner: Not on file     Emotionally abused: Not on file     Physically abused: Not on file     Forced sexual activity: Not on file   Other Topics Concern    Not on file   Social History Narrative    Not on file       Family History   Problem Relation Age of Onset    Diabetes Mother     Asthma Father        Current Outpatient Medications   Medication Sig Dispense Refill    levETIRAcetam (KEPPRA) 500 mg tablet 1 q am 2 qhs 90 Tab 1    MULTIVIT-MINERALS/FA/LYCOPENE (ONE-A-DAY MEN'S PO) Take  by mouth.  Cetirizine (ZYRTEC) 10 mg cap Take  by mouth. Past Medical History:   Diagnosis Date    Epilepsy [facility])        Past Surgical History:   Procedure Laterality Date    HX UROLOGICAL      testical       No Known Allergies    Vital signs:    Visit Vitals  /70 (BP 1 Location: Left arm, BP Patient Position: Sitting)   Pulse 72   Temp 98.6 °F (37 °C) (Oral)   Resp 16   Ht 6' 1\" (1.854 m)   Wt 126.1 kg (278 lb)   SpO2 97%   BMI 36.68 kg/m²       Review of Systems:   GENERAL: Denies fever or fatigue  CARDIAC: No CP or SOB  PULMONARY: No cough of SOB  MUSCULOSKELETAL: No new joint pain  NEURO: SEE HPI      EXAM: Alert, in NAD. Heart is regular. Oriented x3, EOM's are full, PERRL, no facial asymmetries. Strength and tone are normal. DTR's +2, gait symmetric       Assessment/Plan: History of a single nocturnal seizure, without recurrence, MRI evidence of abnormalities that would predict recurrence and with 2 normal EEGs, including an awake, drowsy, and asleep EEG 2 weeks after Keppra discontinuation. He does not have an established diagnosis of epilepsy. Because the possibility that this may reiterates had an later on in life, but at this point there is no reason to treat him with antiepileptic therapies. He will return to neurology as needed        PLEASE NOTE:   Portions of this document may have been produced using voice recognition software. Unrecognized errors in transcription may be present. This note will not be viewable in [address]

## 2019-06-24 NOTE — PROGRESS NOTES
ROOM # 4    Nenita Morales presents today for   Chief Complaint   Patient presents with    Follow-up    Results         Visit Vitals  /70 (BP 1 Location: Left arm, BP Patient Position: Sitting)   Pulse 72   Temp 98.6 °F (37 °C) (Oral)   Resp 16   Ht 6' 1\" (1.854 m)   Wt 278 lb (126.1 kg)   SpO2 97%   BMI 36.68 kg/m²         Advance Directive:  1. Do you have an advance directive in place? Patient Reply: No    2. If not, would you like material regarding how to put one in place? Patient Reply: No    Coordination of Care:  1. Have you been to the ER, urgent care clinic since your last visit? Hospitalized since your last visit?  No

## 2022-03-19 PROBLEM — R56.9 NOCTURNAL SEIZURES (HCC): Status: ACTIVE | Noted: 2017-03-22

## 2022-03-19 PROBLEM — E66.01 SEVERE OBESITY (HCC): Status: ACTIVE | Noted: 2019-04-17

## 2022-03-20 PROBLEM — G47.33 OSA (OBSTRUCTIVE SLEEP APNEA): Status: ACTIVE | Noted: 2017-03-22

## 2023-05-18 RX ORDER — METHOCARBAMOL 750 MG/1
750 TABLET, FILM COATED ORAL 4 TIMES DAILY
COMMUNITY
Start: 2019-12-12